# Patient Record
Sex: MALE | Race: WHITE | NOT HISPANIC OR LATINO | Employment: FULL TIME | URBAN - METROPOLITAN AREA
[De-identification: names, ages, dates, MRNs, and addresses within clinical notes are randomized per-mention and may not be internally consistent; named-entity substitution may affect disease eponyms.]

---

## 2018-01-30 ENCOUNTER — OFFICE VISIT (OUTPATIENT)
Dept: FAMILY MEDICINE CLINIC | Facility: CLINIC | Age: 33
End: 2018-01-30

## 2018-01-30 VITALS
RESPIRATION RATE: 16 BRPM | OXYGEN SATURATION: 99 % | WEIGHT: 185 LBS | SYSTOLIC BLOOD PRESSURE: 104 MMHG | DIASTOLIC BLOOD PRESSURE: 72 MMHG | HEART RATE: 84 BPM | HEIGHT: 71 IN | BODY MASS INDEX: 25.9 KG/M2 | TEMPERATURE: 97.9 F

## 2018-01-30 DIAGNOSIS — K21.9 GASTROESOPHAGEAL REFLUX DISEASE, ESOPHAGITIS PRESENCE NOT SPECIFIED: ICD-10-CM

## 2018-01-30 DIAGNOSIS — Z13.220 ENCOUNTER FOR LIPID SCREENING FOR CARDIOVASCULAR DISEASE: ICD-10-CM

## 2018-01-30 DIAGNOSIS — F51.02 ADJUSTMENT INSOMNIA: Primary | ICD-10-CM

## 2018-01-30 DIAGNOSIS — Z13.6 ENCOUNTER FOR LIPID SCREENING FOR CARDIOVASCULAR DISEASE: ICD-10-CM

## 2018-01-30 DIAGNOSIS — Z30.09 VASECTOMY EVALUATION: ICD-10-CM

## 2018-01-30 LAB
ALBUMIN SERPL-MCNC: 4.5 G/DL (ref 3.2–4.8)
ALBUMIN/GLOB SERPL: 1.9 G/DL (ref 1.5–2.5)
ALP SERPL-CCNC: 70 U/L (ref 25–100)
ALT SERPL-CCNC: 26 U/L (ref 7–40)
AST SERPL-CCNC: 14 U/L (ref 0–33)
BASOPHILS # BLD AUTO: 0.02 10*3/MM3 (ref 0–0.2)
BASOPHILS NFR BLD AUTO: 0.4 % (ref 0–1)
BILIRUB SERPL-MCNC: 0.4 MG/DL (ref 0.3–1.2)
BUN SERPL-MCNC: 17 MG/DL (ref 9–23)
BUN/CREAT SERPL: 18.9 (ref 7–25)
CALCIUM SERPL-MCNC: 9.1 MG/DL (ref 8.7–10.4)
CHLORIDE SERPL-SCNC: 107 MMOL/L (ref 99–109)
CHOLEST SERPL-MCNC: 179 MG/DL (ref 0–200)
CHOLEST/HDLC SERPL: 3.58 {RATIO}
CO2 SERPL-SCNC: 27 MMOL/L (ref 20–31)
CREAT SERPL-MCNC: 0.9 MG/DL (ref 0.6–1.3)
EOSINOPHIL # BLD AUTO: 0.05 10*3/MM3 (ref 0–0.3)
EOSINOPHIL NFR BLD AUTO: 0.9 % (ref 0–3)
ERYTHROCYTE [DISTWIDTH] IN BLOOD BY AUTOMATED COUNT: 13.6 % (ref 11.3–14.5)
GFR SERPLBLD CREATININE-BSD FMLA CKD-EPI: 119 ML/MIN/1.73
GFR SERPLBLD CREATININE-BSD FMLA CKD-EPI: 98 ML/MIN/1.73
GLOBULIN SER CALC-MCNC: 2.4 GM/DL
GLUCOSE SERPL-MCNC: 63 MG/DL (ref 70–100)
HCT VFR BLD AUTO: 46.2 % (ref 38.9–50.9)
HDLC SERPL-MCNC: 50 MG/DL (ref 40–60)
HGB BLD-MCNC: 14.6 G/DL (ref 13.1–17.5)
IMM GRANULOCYTES # BLD: 0.01 10*3/MM3 (ref 0–0.03)
IMM GRANULOCYTES NFR BLD: 0.2 % (ref 0–0.6)
LDLC SERPL CALC-MCNC: 112 MG/DL (ref 0–100)
LYMPHOCYTES # BLD AUTO: 2.07 10*3/MM3 (ref 0.6–4.8)
LYMPHOCYTES NFR BLD AUTO: 38.4 % (ref 24–44)
MCH RBC QN AUTO: 28.6 PG (ref 27–31)
MCHC RBC AUTO-ENTMCNC: 31.6 G/DL (ref 32–36)
MCV RBC AUTO: 90.6 FL (ref 80–99)
MONOCYTES # BLD AUTO: 0.62 10*3/MM3 (ref 0–1)
MONOCYTES NFR BLD AUTO: 11.5 % (ref 0–12)
NEUTROPHILS # BLD AUTO: 2.62 10*3/MM3 (ref 1.5–8.3)
NEUTROPHILS NFR BLD AUTO: 48.6 % (ref 41–71)
PLATELET # BLD AUTO: 230 10*3/MM3 (ref 150–450)
POTASSIUM SERPL-SCNC: 4.6 MMOL/L (ref 3.5–5.5)
PROT SERPL-MCNC: 6.9 G/DL (ref 5.7–8.2)
RBC # BLD AUTO: 5.1 10*6/MM3 (ref 4.2–5.76)
SODIUM SERPL-SCNC: 139 MMOL/L (ref 132–146)
TRIGL SERPL-MCNC: 86 MG/DL (ref 0–150)
VLDLC SERPL CALC-MCNC: 17.2 MG/DL
WBC # BLD AUTO: 5.39 10*3/MM3 (ref 3.5–10.8)

## 2018-01-30 PROCEDURE — 99203 OFFICE O/P NEW LOW 30 MIN: CPT | Performed by: FAMILY MEDICINE

## 2018-01-30 RX ORDER — ZOLPIDEM TARTRATE 5 MG/1
5 TABLET ORAL NIGHTLY PRN
Qty: 30 TABLET | Refills: 1 | Status: SHIPPED | OUTPATIENT
Start: 2018-01-30 | End: 2020-08-04

## 2018-01-30 NOTE — PROGRESS NOTES
Assessment/Plan     Problem List Items Addressed This Visit     None      Visit Diagnoses     Adjustment insomnia    -  Primary    Relevant Medications    zolpidem (AMBIEN) 5 MG tablet    Other Relevant Orders    CBC & Differential    Vasectomy evaluation        Relevant Orders    Ambulatory Referral to Urology    Encounter for lipid screening for cardiovascular disease        Relevant Orders    Comprehensive Metabolic Panel    Lipid Panel With / Chol / HDL Ratio    Gastroesophageal reflux disease, esophagitis presence not specified        Relevant Medications    esomeprazole (nexIUM) 20 MG capsule           Follow up: No Follow-up on file.     DISCUSSION  Insomnia: Rec try Ambien 5 mg one po q hs as needed. Try 1st when someone is there to help with children.     Refer to urology for evaluation of the vasectomy.    Migraine headaches: May be worsened due to fatigue and decreased sleep.  If does not improve with the Ambien, he will let us know.  Excedrin Migraine does help the headaches when he gets those.    Gastroesophageal reflux disease.  Okay to use Nexium as needed.  Currently using about twice a week.  Call if needing more frequently.  Recommend avoid foods that cause reflux.    Check blood work for screening as well.    MEDICATIONS PRESCRIBED  Requested Prescriptions     Signed Prescriptions Disp Refills   • zolpidem (AMBIEN) 5 MG tablet 30 tablet 1     Sig: Take 1 tablet by mouth At Night As Needed for Sleep.            Sherwin dated on 1/30/2018   was reviewed and appropriate.        -------------------------------------------    Subjective     Chief Complaint   Patient presents with   • Establish Care   • sleep issues     having trouble falling and staying asleep   • Sterilization     get info on this    • Heartburn       HPI      Needs check up  Wants to maintain health      Interested in vasectomy  3 children ( 2 (five yo) and one 3 yo) ( one of the 6 yo lives with him)  Work for [employer]  Works  "nights      Sleep issues  Issues falling asleep and then staying asleep  3 children, work  Mind races at night  Gets anxious when not able to sleep  Home at 2-3 am. Bed at 4 to 4:30 am - up at 10 to 10:30am. In bed 6 hrs but takes awhile to get to sleep.   No meds tried.   Worked nights 15 yrs  Worse x 6 months  On the 2/2 youngest at . Oldest to go to school.       Headaches as a child, Better as he got older  Now + headaches again q 2 weeks  + migraine  Worse again now  X 1 yr, worse now  If wakes up early, + headache.   Excedrin migraine helps      Heartburn  Takes nexium OTC   As needed and works most of the time helps  Takes 2 times per week.   Certain food make it worse  Not able to drink OJ  Worse with acidic foods    Past Medical History,Medications, Allergies, and social history was reviewed.    Review of Systems   Constitutional: Negative.    Respiratory: Negative.    Cardiovascular: Negative.    Gastrointestinal: Negative.    Musculoskeletal: Negative.    Neurological: Positive for headaches.   Psychiatric/Behavioral: Negative.        Objective     Vitals:    01/30/18 1407   BP: 104/72   Pulse: 84   Resp: 16   Temp: 97.9 °F (36.6 °C)   TempSrc: Temporal Artery    SpO2: 99%   Weight: 83.9 kg (185 lb)   Height: 180.3 cm (71\")        Physical Exam   Constitutional: He is oriented to person, place, and time. Vital signs are normal. He appears well-developed and well-nourished.   HENT:   Head: Normocephalic and atraumatic.   Right Ear: Hearing, tympanic membrane, external ear and ear canal normal.   Left Ear: Hearing, tympanic membrane, external ear and ear canal normal.   Nose: Nose normal.   Mouth/Throat: Oropharynx is clear and moist.   Eyes: Conjunctivae, EOM and lids are normal. Pupils are equal, round, and reactive to light.   Neck: Normal range of motion. Neck supple. No thyromegaly present.   Cardiovascular: Normal rate, regular rhythm and normal heart sounds.  Exam reveals no friction rub.  "   No murmur heard.  Pulmonary/Chest: Effort normal and breath sounds normal. No respiratory distress. He has no wheezes. He has no rales.   Abdominal: Soft. Normal appearance and bowel sounds are normal. He exhibits no distension and no mass. There is no tenderness. There is no rebound and no guarding.   Musculoskeletal: He exhibits no edema.   Neurological: He is alert and oriented to person, place, and time. He has normal strength.   Skin: Skin is warm and dry.   Psychiatric: He has a normal mood and affect. His speech is normal and behavior is normal. Cognition and memory are normal.   Nursing note and vitals reviewed.              Laci Mendoza MD

## 2019-01-02 ENCOUNTER — OFFICE VISIT (OUTPATIENT)
Dept: FAMILY MEDICINE CLINIC | Facility: CLINIC | Age: 34
End: 2019-01-02

## 2019-01-02 VITALS
WEIGHT: 187 LBS | TEMPERATURE: 97.8 F | DIASTOLIC BLOOD PRESSURE: 86 MMHG | RESPIRATION RATE: 16 BRPM | HEART RATE: 84 BPM | BODY MASS INDEX: 26.18 KG/M2 | HEIGHT: 71 IN | SYSTOLIC BLOOD PRESSURE: 128 MMHG

## 2019-01-02 DIAGNOSIS — R45.4 IRRITABILITY AND ANGER: Primary | ICD-10-CM

## 2019-01-02 DIAGNOSIS — F33.1 MODERATE EPISODE OF RECURRENT MAJOR DEPRESSIVE DISORDER (HCC): ICD-10-CM

## 2019-01-02 PROCEDURE — 99214 OFFICE O/P EST MOD 30 MIN: CPT | Performed by: FAMILY MEDICINE

## 2019-01-02 RX ORDER — FLUOXETINE HYDROCHLORIDE 20 MG/1
20 CAPSULE ORAL DAILY
Qty: 30 CAPSULE | Refills: 2 | Status: SHIPPED | OUTPATIENT
Start: 2019-01-02 | End: 2019-01-30 | Stop reason: SDUPTHER

## 2019-01-02 RX ORDER — ARIPIPRAZOLE 5 MG/1
5 TABLET ORAL DAILY
Qty: 30 TABLET | Refills: 2 | Status: SHIPPED | OUTPATIENT
Start: 2019-01-02 | End: 2019-01-30 | Stop reason: SDUPTHER

## 2019-01-02 NOTE — PATIENT INSTRUCTIONS
Go to the nearest ER or return to clinic if symptoms worsen, fever/chill develop      Tips for Managing Your Anger  HOW CAN ANGER AFFECT MY LIFE?  Everyone feels angry from time to time. It is okay and normal to feel angry. However, the way that you behave or react to anger can make it a problem. If you react too strongly to anger or you cannot control your anger, that can cause relationships problems at home and work.  Anger can also affect your health. Uncontrolled anger increases your risk of heart disease. When you are angry, your heart rate and blood pressure rise. Levels of certain energy hormones, such as adrenaline, also increase. When this happens, your heart has to work harder. In extreme cases, anger can cause the blood vessels to become narrow. This reduces the supply of blood and oxygen to the heart, and that can trigger chest pain (angina).  Anger can also trigger stress-related problems, such as:  · Headaches.  · Poor digestion.  · Trouble sleeping (insomnia).    WHAT ACTIONS CAN I TAKE TO HELP MANAGE MY ANGER?  You can take actions to help you manage your anger. For example:  · Express your anger. When you express your anger in a healthy way, it is a form of communication. The following strategies can help you to express your anger in a healthy way and when you are ready to do so:  ? Step away. When you are feeling reactive, it may take at least 20 minutes for your body to return to its normal blood pressure and heart rate. To help your body do this, take a walk, listen to music, stretch, take deep breaths, and avoid the situation or person who is making you angry. Try to only discuss your anger when you feel calm again.  ? Try to consider how others feel before you react. Avoid swearing, sighing, raising your voice, or blaming.  ? Choose a good time to work through problems. You may be more likely to lose your temper at the end of the day when you are tired.  ? Keep an anger journal. Writing down  the situations that make you angry can help you figure out what triggers your anger and why.  · Consider changing your perception. Is there another way you can view the situation that will leave you with a different emotion? Sometimes, changing the way you think about a situation can make it seem less infuriating. Here are some ways to do that:  ? Remind yourself that everyone is not out to get you.  ? Remind yourself that a disappointing result is not the end of the world.  ? Take steps to solve or prevent the situation that upsets you.  ? Find the humor in an aggravating situation.  ? Deal with the physical effects by taking deep breaths, exercising, or taking a walk.  ? Slowly repeat the word “relax” or another calming phrase.  ? Picture a relaxing image in your mind. Close your eyes and use that image to help you calm yourself.    WHEN SHOULD I SEEK ADDITIONAL HELP?  Anger becomes a problem if it occurs frequently and lasts for long periods of time. You may also need help managing your anger if:  · You use physical force or aggression when you are angry and others feel threatened and fearful.  · You feel that your anger is out of control.  · Anger is interfering with your job.  · Anger is causing problems with your health.  · Anger is causing problems with your relationships.  · Anger is affecting your ability to tolerate normal daily situations, such as sitting in a traffic jam or waiting in line.  · You treat others disrespectfully.  · You do not trust people around you.    It may help to ask someone you trust whether he or she thinks you show any of these signs. Sometimes, it can be hard to recognize the problem yourself.  WHERE CAN I GET SUPPORT?  A psychologist or another licensed mental health professional can help you learn how to manage your anger. Ask your health care provider for a referral, or look online to find a psychologist who specializes in anger management. You can search the websites of many  mental health organizations to find a mental health care provider. Local Domestic Abuse Projects are also available for help.  Your local hospital or behavioral counselors in your area may also offer anger management programs or support groups that can help.  WHERE CAN I FIND MORE INFORMATION?  · The U.S. Centers for Disease Control and Prevention: www.cdc.gov/bam/life/getting-along3.html  · American Psychological Association: www.apa.org/topics/anger/  · The Substance Abuse and Mental Health Services Administration: http://www.samhsa.gov/samhsaNewsLetter/Volume_22_Number_3/working_with_anger/  · National Resource Center on Domestic Violence: http://www.nrcdv.org/dvam/    This information is not intended to replace advice given to you by your health care provider. Make sure you discuss any questions you have with your health care provider.  Document Released: 10/14/2008 Document Revised: 08/20/2017 Document Reviewed: 10/21/2016  Elsevier Interactive Patient Education © 2018 Elsevier Inc.
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
## 2019-01-30 ENCOUNTER — OFFICE VISIT (OUTPATIENT)
Dept: FAMILY MEDICINE CLINIC | Facility: CLINIC | Age: 34
End: 2019-01-30

## 2019-01-30 VITALS
RESPIRATION RATE: 16 BRPM | BODY MASS INDEX: 27.02 KG/M2 | HEART RATE: 78 BPM | DIASTOLIC BLOOD PRESSURE: 72 MMHG | WEIGHT: 193 LBS | HEIGHT: 71 IN | TEMPERATURE: 98 F | SYSTOLIC BLOOD PRESSURE: 124 MMHG

## 2019-01-30 DIAGNOSIS — F33.1 MODERATE EPISODE OF RECURRENT MAJOR DEPRESSIVE DISORDER (HCC): ICD-10-CM

## 2019-01-30 DIAGNOSIS — R45.4 IRRITABILITY AND ANGER: ICD-10-CM

## 2019-01-30 PROCEDURE — 99213 OFFICE O/P EST LOW 20 MIN: CPT | Performed by: FAMILY MEDICINE

## 2019-01-30 RX ORDER — FLUOXETINE HYDROCHLORIDE 20 MG/1
20 CAPSULE ORAL DAILY
Qty: 30 CAPSULE | Refills: 2 | Status: SHIPPED | OUTPATIENT
Start: 2019-01-30 | End: 2020-07-28

## 2019-01-30 RX ORDER — ARIPIPRAZOLE 5 MG/1
5 TABLET ORAL DAILY
Qty: 30 TABLET | Refills: 2 | Status: SHIPPED | OUTPATIENT
Start: 2019-01-30 | End: 2020-07-28

## 2019-01-30 NOTE — PROGRESS NOTES
Subjective   Kashif Garcia is a 33 y.o. male.     History of Present Illness     His mood is better on the prozac and abilify  He has been pleased with results  He is doing counseling that has been court ordered with anger management.  He will then be going to [facility] in the future  Not feeling as down nor depressed  Motivation and get up and go also improved      Review of Systems   Psychiatric/Behavioral: Negative for dysphoric mood. The patient is not nervous/anxious.        Objective   Physical Exam   Constitutional: He appears well-developed and well-nourished. No distress.   Cardiovascular: Normal rate, regular rhythm and normal heart sounds.   Pulmonary/Chest: Effort normal and breath sounds normal.   Psychiatric: He has a normal mood and affect. His behavior is normal. Judgment and thought content normal.   Nursing note and vitals reviewed.      Assessment/Plan   Kashif was seen today for follow-up.    Diagnoses and all orders for this visit:    Irritability and anger  -     FLUoxetine (PROZAC) 20 MG capsule; Take 1 capsule by mouth Daily.  -     ARIPiprazole (ABILIFY) 5 MG tablet; Take 1 tablet by mouth Daily.    Moderate episode of recurrent major depressive disorder (CMS/HCC)  -     FLUoxetine (PROZAC) 20 MG capsule; Take 1 capsule by mouth Daily.  -     ARIPiprazole (ABILIFY) 5 MG tablet; Take 1 tablet by mouth Daily.    mood has been really doing great with prozac and abilify.  He has seen a major improvement in how he has been feeling with the medicine.  He will continue counseling and we will see him back in 5 months

## 2020-02-07 ENCOUNTER — OFFICE VISIT (OUTPATIENT)
Dept: FAMILY MEDICINE CLINIC | Facility: CLINIC | Age: 35
End: 2020-02-07

## 2020-02-07 VITALS
HEART RATE: 78 BPM | BODY MASS INDEX: 28.14 KG/M2 | WEIGHT: 201 LBS | SYSTOLIC BLOOD PRESSURE: 142 MMHG | RESPIRATION RATE: 18 BRPM | TEMPERATURE: 97.7 F | DIASTOLIC BLOOD PRESSURE: 80 MMHG | HEIGHT: 71 IN

## 2020-02-07 DIAGNOSIS — Z72.0 TOBACCO ABUSE: ICD-10-CM

## 2020-02-07 DIAGNOSIS — J40 BRONCHITIS: Primary | ICD-10-CM

## 2020-02-07 PROCEDURE — 99406 BEHAV CHNG SMOKING 3-10 MIN: CPT | Performed by: FAMILY MEDICINE

## 2020-02-07 PROCEDURE — 99214 OFFICE O/P EST MOD 30 MIN: CPT | Performed by: FAMILY MEDICINE

## 2020-02-07 RX ORDER — AZITHROMYCIN 250 MG/1
TABLET, FILM COATED ORAL
Qty: 6 TABLET | Refills: 0 | Status: SHIPPED | OUTPATIENT
Start: 2020-02-07 | End: 2020-07-28

## 2020-02-07 RX ORDER — PREDNISONE 20 MG/1
40 TABLET ORAL DAILY
Qty: 10 TABLET | Refills: 0 | Status: SHIPPED | OUTPATIENT
Start: 2020-02-07 | End: 2020-07-28

## 2020-02-07 RX ORDER — VARENICLINE TARTRATE 1 MG/1
1 TABLET, FILM COATED ORAL 2 TIMES DAILY
Qty: 60 TABLET | Refills: 3 | Status: SHIPPED | OUTPATIENT
Start: 2020-02-07 | End: 2020-07-28

## 2020-02-07 NOTE — PROGRESS NOTES
Subjective   Kashif Garcia is a 34 y.o. male.     History of Present Illness     He has had more congestion the last week or so  Deep chest congestion  Voice is altered as well  Feeling slightly worse  Using OTC medicine without help    He smokes about 1/2-1 PPD and has done so the last 15 years  He is motivated to quit smoking  Failed the patch as it did not help enough  Wants to quit for his 4 kids, recently had new daughter  Wife is quitting as well so great time for him to quit  Asks about chantix      The following portions of the patient's history were reviewed and updated as appropriate: allergies, current medications, past family history, past medical history, past social history, past surgical history and problem list.    Review of Systems   Constitutional: Negative.  Negative for fever.   HENT: Positive for congestion.    Eyes: Negative.    Respiratory: Positive for cough.    Cardiovascular: Negative.    Gastrointestinal: Negative.    Musculoskeletal: Negative.    Skin: Negative.    Neurological: Negative.    Psychiatric/Behavioral: Negative.    All other systems reviewed and are negative.      Objective   Physical Exam   Constitutional: He is oriented to person, place, and time. He appears well-developed and well-nourished. No distress.   HENT:   Head: Normocephalic and atraumatic.   Right Ear: Hearing, tympanic membrane, external ear and ear canal normal.   Left Ear: Hearing, tympanic membrane, external ear and ear canal normal.   Nose: Nose normal.   Mouth/Throat: Uvula is midline, oropharynx is clear and moist and mucous membranes are normal.   Eyes: Conjunctivae and EOM are normal.   Neck: Normal range of motion.   Cardiovascular: Normal rate, regular rhythm and normal heart sounds.   Pulmonary/Chest: Effort normal.   Minor congestion in chest   Lymphadenopathy:     He has no cervical adenopathy.   Neurological: He is alert and oriented to person, place, and time.   Psychiatric: He has a normal  mood and affect. His behavior is normal. Judgment and thought content normal.   Nursing note and vitals reviewed.      Assessment/Plan   Kashif was seen today for nicotine dependence.    Diagnoses and all orders for this visit:    Bronchitis  -     predniSONE (DELTASONE) 20 MG tablet; Take 2 tablets by mouth Daily.  -     azithromycin (ZITHROMAX) 250 MG tablet; Take 2 tablets the first day, then 1 tablet daily for 4 days.    Tobacco abuse  -     varenicline (CHANTIX CONTINUING MONTH HAL) 1 MG tablet; Take 1 tablet by mouth 2 (Two) Times a Day.  -     varenicline (CHANTIX STARTING MONTH PAK) 0.5 MG X 11 & 1 MG X 42 tablet; Take 0.5 mg one daily on days 1-2 and 0.5 mg twice daily on days 4-7. Then 1 mg twice daily for a total of 12 weeks.    due to duration and progression of infection will treat with steroids and antibiotics, pt to call back INB    Discussed with pt the health implications of smoking and the benefits of cessation.  Reviewed pros/cons/SE of medications and prescription for chantix sent in.  Pt advised to get rid of ashtrays and lighters to make it hard to resume smoking and encouragement give.  F/u with any issues or complaints.  More than 5 minutes spent discussing this.

## 2020-07-28 ENCOUNTER — TELEMEDICINE (OUTPATIENT)
Dept: FAMILY MEDICINE CLINIC | Facility: CLINIC | Age: 35
End: 2020-07-28

## 2020-07-28 DIAGNOSIS — R52 GENERALIZED BODY ACHES: ICD-10-CM

## 2020-07-28 DIAGNOSIS — J02.9 SORE THROAT: ICD-10-CM

## 2020-07-28 DIAGNOSIS — J06.9 ACUTE URI: Primary | ICD-10-CM

## 2020-07-28 PROCEDURE — 99213 OFFICE O/P EST LOW 20 MIN: CPT | Performed by: PHYSICIAN ASSISTANT

## 2020-07-28 RX ORDER — PREDNISONE 20 MG/1
20 TABLET ORAL DAILY
Qty: 10 TABLET | Refills: 0 | Status: SHIPPED | OUTPATIENT
Start: 2020-07-28 | End: 2020-08-04

## 2020-07-28 RX ORDER — AZITHROMYCIN 250 MG/1
TABLET, FILM COATED ORAL
Qty: 6 TABLET | Refills: 0 | Status: SHIPPED | OUTPATIENT
Start: 2020-07-28 | End: 2020-08-04

## 2020-07-28 NOTE — PROGRESS NOTES
Subjective   Kashif Garcia is a 35 y.o. male.     Video visit  You have chosen to receive care through a telehealth visit.  Do you consent to use a video/audio connection for your medical care today? Yes    History of Present Illness   Pt presents for video visit with CC of sore throat, hoarse voice, HA, body aches (all over)   Symptoms started on Friday. Worsened Monday   Sneezing occasionally. Some drainage.   Not super painful to swallow   No sinus pressure in cheeks or forehead   No ear pain   No cough or SOB   No fever he suspects, thermometer has not been helping   No alteration of taste and smell     No frequent strep infections   Feels like symptoms are getting worse   No change in bowel movements   No urinary symptoms       Pt works at [employer]. No recent travel. Camped at [place] during shut down   Infant in home has been coughing. Wife states she had some body aches and diarrhea a few days ago   Would like to be screened for COVID 19  No known contact with coronavirus     The following portions of the patient's history were reviewed and updated as appropriate: allergies, current medications, past family history, past medical history, past social history, past surgical history and problem list.    Review of Systems   Constitutional: Positive for fatigue. Negative for chills, diaphoresis and fever.   HENT: Positive for congestion, sore throat, trouble swallowing and voice change. Negative for ear discharge, ear pain, hearing loss, nosebleeds, postnasal drip, sinus pressure and sneezing.    Eyes: Negative.    Respiratory: Negative.  Negative for cough, chest tightness, shortness of breath and wheezing.    Cardiovascular: Negative.  Negative for chest pain, palpitations and leg swelling.   Gastrointestinal: Negative for abdominal distention, abdominal pain, blood in stool, constipation, diarrhea, nausea and vomiting.   Genitourinary: Negative.  Negative for difficulty urinating, dysuria, flank pain,  frequency, hematuria and urgency.   Musculoskeletal: Positive for myalgias. Negative for arthralgias, back pain, gait problem, joint swelling, neck pain and neck stiffness.   Skin: Negative.  Negative for color change, pallor, rash and wound.   Neurological: Positive for headaches. Negative for dizziness, syncope, weakness, light-headedness and numbness.       Objective    Physical Exam   Constitutional: He is oriented to person, place, and time. He appears well-developed and well-nourished. No distress.   HENT:   Hoarse voice    Pulmonary/Chest: Effort normal.   Neurological: He is alert and oriented to person, place, and time.   Psychiatric: He has a normal mood and affect. His behavior is normal. Judgment and thought content normal.   unable to obtain additional physical exam due to video visit     Assessment/Plan   Kashif was seen today for sore throat.    Diagnoses and all orders for this visit:    Acute URI  -     azithromycin (Zithromax Z-Johan) 250 MG tablet; Take 2 tablets the first day, then 1 tablet daily for 4 days.  -     predniSONE (DELTASONE) 20 MG tablet; Take 1 tablet by mouth Daily.    Sore throat  -     COVID-19, AEL, NP Swab in Transport Media,Saline, or ESwab 36-72 HR TAT - Swab, Nasopharynx    Generalized body aches  -     COVID-19, AEL, NP Swab in Transport Media,Saline, or ESwab 36-72 HR TAT - Swab, Nasopharynx      Begin treatment as outlined in plan   Due to current symptoms, will have patient screened for COVID 19 as well  Self quarantine directions provided   Follow up at  if new or worsening symptoms develop  Will remain off work pending COVID testing     This visit has been scheduled as a video visit to comply with patient safety concerns in accordance with CDC recommendations. Total time of discussion was 15 minutes.

## 2020-08-04 ENCOUNTER — OFFICE VISIT (OUTPATIENT)
Dept: FAMILY MEDICINE CLINIC | Facility: CLINIC | Age: 35
End: 2020-08-04

## 2020-08-04 VITALS
HEART RATE: 78 BPM | HEIGHT: 71 IN | WEIGHT: 223 LBS | BODY MASS INDEX: 31.22 KG/M2 | RESPIRATION RATE: 18 BRPM | TEMPERATURE: 97.6 F | SYSTOLIC BLOOD PRESSURE: 124 MMHG | DIASTOLIC BLOOD PRESSURE: 84 MMHG

## 2020-08-04 DIAGNOSIS — G89.29 CHRONIC INTRACTABLE HEADACHE, UNSPECIFIED HEADACHE TYPE: ICD-10-CM

## 2020-08-04 DIAGNOSIS — J01.10 ACUTE NON-RECURRENT FRONTAL SINUSITIS: Primary | ICD-10-CM

## 2020-08-04 DIAGNOSIS — R51.9 CHRONIC INTRACTABLE HEADACHE, UNSPECIFIED HEADACHE TYPE: ICD-10-CM

## 2020-08-04 PROCEDURE — 99213 OFFICE O/P EST LOW 20 MIN: CPT | Performed by: FAMILY MEDICINE

## 2020-08-04 RX ORDER — ARIPIPRAZOLE 5 MG/1
5 TABLET ORAL
COMMUNITY
Start: 2019-01-30 | End: 2020-10-02

## 2020-08-04 RX ORDER — AMOXICILLIN AND CLAVULANATE POTASSIUM 875; 125 MG/1; MG/1
1 TABLET, FILM COATED ORAL 2 TIMES DAILY
Qty: 14 TABLET | Refills: 0 | Status: SHIPPED | OUTPATIENT
Start: 2020-08-04 | End: 2020-08-10

## 2020-08-04 RX ORDER — ACETAMINOPHEN AND CODEINE PHOSPHATE 300; 30 MG/1; MG/1
1 TABLET ORAL EVERY 4 HOURS PRN
Qty: 10 TABLET | Refills: 0 | Status: SHIPPED | OUTPATIENT
Start: 2020-08-04 | End: 2020-10-23 | Stop reason: SDUPTHER

## 2020-08-04 RX ORDER — FLUOXETINE HYDROCHLORIDE 20 MG/1
20 CAPSULE ORAL
COMMUNITY
Start: 2019-01-30 | End: 2020-10-02

## 2020-08-04 NOTE — PROGRESS NOTES
Subjective   Kashif Garcia is a 35 y.o. male.     History of Present Illness     He was tested for COVID on 7/29, this was negative    He had lost his voice  Had ST and fever  Had some body aches and some mild HA    On 7/28 he was placed on azithromycin and prednisone    No fever in the last 6 days  Today he has some mild achiness and some chest congestion but this is improved  Still mild HA and body aches    No one else in the house is ill, 4 kids and fiance lives with him    Review of Systems   Constitutional: Negative for fever.   HENT: Positive for voice change.    Musculoskeletal: Positive for myalgias.       Objective   Physical Exam   Constitutional: He appears well-developed and well-nourished. No distress.   HENT:   Head: Normocephalic and atraumatic.   Neck: No thyromegaly present.   Cardiovascular: Normal rate, regular rhythm and normal heart sounds.   Pulmonary/Chest: Effort normal and breath sounds normal.   Lymphadenopathy:     He has no cervical adenopathy.   Psychiatric: He has a normal mood and affect. His behavior is normal. Judgment and thought content normal.   Nursing note and vitals reviewed.      Assessment/Plan   Kashif was seen today for generalized body aches and uri.    Diagnoses and all orders for this visit:    Acute non-recurrent frontal sinusitis  -     amoxicillin-clavulanate (Augmentin) 875-125 MG per tablet; Take 1 tablet by mouth 2 (Two) Times a Day.    Chronic intractable headache, unspecified headache type  -     acetaminophen-codeine (TYLENOL #3) 300-30 MG per tablet; Take 1 tablet by mouth Every 4 (Four) Hours As Needed for Moderate Pain .    he is seeing improvement, will keep him off work this week  Plan to RTW on Monday 8/10/20  Will try augmenitn BID and tylenol #3,  Fluids, rest, and more time

## 2020-08-10 ENCOUNTER — OFFICE VISIT (OUTPATIENT)
Dept: FAMILY MEDICINE CLINIC | Facility: CLINIC | Age: 35
End: 2020-08-10

## 2020-08-10 VITALS
BODY MASS INDEX: 31.22 KG/M2 | TEMPERATURE: 98 F | SYSTOLIC BLOOD PRESSURE: 141 MMHG | HEART RATE: 88 BPM | HEIGHT: 71 IN | RESPIRATION RATE: 18 BRPM | WEIGHT: 223 LBS | DIASTOLIC BLOOD PRESSURE: 99 MMHG

## 2020-08-10 DIAGNOSIS — J01.10 ACUTE NON-RECURRENT FRONTAL SINUSITIS: Primary | ICD-10-CM

## 2020-08-10 PROCEDURE — 99213 OFFICE O/P EST LOW 20 MIN: CPT | Performed by: FAMILY MEDICINE

## 2020-08-10 NOTE — PROGRESS NOTES
Subjective   Kashif Garcia is a 35 y.o. male.     History of Present Illness     He is feeling bck to normal  No complaints and he wants to go back to work  Works at [employer]        Review of Systems   Constitutional: Negative.    HENT: Negative.    Psychiatric/Behavioral: Negative.        Objective   Physical Exam   Constitutional: He appears well-developed and well-nourished. No distress.   Cardiovascular: Normal rate, regular rhythm and normal heart sounds.   Pulmonary/Chest: Effort normal and breath sounds normal.   Psychiatric: He has a normal mood and affect. His behavior is normal.   Nursing note and vitals reviewed.      Assessment/Plan   Kashif was seen today for return to work.    Diagnoses and all orders for this visit:    Acute non-recurrent frontal sinusitis      Ok to RWT without restrictions, he needed note for work

## 2020-10-02 ENCOUNTER — TELEMEDICINE (OUTPATIENT)
Dept: FAMILY MEDICINE CLINIC | Facility: CLINIC | Age: 35
End: 2020-10-02

## 2020-10-02 DIAGNOSIS — G44.83 PRIMARY COUGH HEADACHE: ICD-10-CM

## 2020-10-02 DIAGNOSIS — J02.9 SORE THROAT: ICD-10-CM

## 2020-10-02 DIAGNOSIS — J06.9 VIRAL URI WITH COUGH: Primary | ICD-10-CM

## 2020-10-02 PROCEDURE — 99213 OFFICE O/P EST LOW 20 MIN: CPT | Performed by: NURSE PRACTITIONER

## 2020-10-02 RX ORDER — ALBUTEROL SULFATE 90 UG/1
2 AEROSOL, METERED RESPIRATORY (INHALATION)
COMMUNITY
Start: 2020-07-29

## 2020-10-02 NOTE — PROGRESS NOTES
You have chosen to receive care through a telehealth visit.  Do you consent to use a video/audio connection for your medical care today? Yes  Covid negative, flu test, [facility]   ST, cough and change in voice, runny nose, diarrhea, HA, Tylenol 3 due to Migraine yesterday, body ache, tightness in chest but no wheezing or difficulty dry cough non productive Sept 29 woke up after having to wear a fitted mask  He feels poorly, he has 4 children at home and is concerned about getting them sick. No one else is ill.    ROS: as above    PE: video visit    DX: URI with cough, ST, headache    Plan: Pt has an Albuterol inhaler at home which he has not started using yet, encouraged to get this out and use it as directed.  Will send in Breo inhaler to use one inhalation per day and rinse  Will prescribe some cough medication to help with cough so he can sleep. If he remains symptomatic he cannot return to work on Monday. He can forward FMLA forms to be completed. F/U Friday if still off work to reassess. Pt agrees

## 2020-10-09 ENCOUNTER — TELEMEDICINE (OUTPATIENT)
Dept: FAMILY MEDICINE CLINIC | Facility: CLINIC | Age: 35
End: 2020-10-09

## 2020-10-09 DIAGNOSIS — J06.9 URI WITH COUGH AND CONGESTION: Primary | ICD-10-CM

## 2020-10-09 DIAGNOSIS — R06.2 WHEEZING: ICD-10-CM

## 2020-10-09 PROCEDURE — 99213 OFFICE O/P EST LOW 20 MIN: CPT | Performed by: NURSE PRACTITIONER

## 2020-10-09 RX ORDER — AZITHROMYCIN 250 MG/1
TABLET, FILM COATED ORAL
Qty: 6 TABLET | Refills: 0 | Status: SHIPPED | OUTPATIENT
Start: 2020-10-09 | End: 2020-10-16

## 2020-10-09 RX ORDER — PREDNISONE 20 MG/1
TABLET ORAL
Qty: 10 TABLET | Refills: 0 | Status: SHIPPED | OUTPATIENT
Start: 2020-10-09 | End: 2020-10-16

## 2020-10-09 NOTE — PROGRESS NOTES
You have chosen to receive care through a telehealth visit.  Do you consent to use a video/audio connection for your medical care today? Yes    HPI:  Cough and congestion, deep cough and nasal congestion  Inhaler is helping and cough med is helping   Feeling tightness in chest, Breo helps   Still off work, no fever or chills, cannot return until symptoms have resolved. Has FMLA forms that he needs to have completed.  Daughter is sick with symptoms now too.    ROS: as above    PE: video visit  Deep dry, hacky cough. No respiratory distress. Pink skin color. Alert and oriented.    DX: URI with cough, wheezing, congestion    Plan: will send in Zpak and steroids, continue Breo and Alburerol inhalers, can use OTC medication for congestion.  Will complete FMLA forms when available from employer. If cough persists pt will need to have CXR. Pt agrees. Off work until 10/16 .

## 2020-10-16 ENCOUNTER — TELEMEDICINE (OUTPATIENT)
Dept: FAMILY MEDICINE CLINIC | Facility: CLINIC | Age: 35
End: 2020-10-16

## 2020-10-16 ENCOUNTER — HOSPITAL ENCOUNTER (OUTPATIENT)
Dept: GENERAL RADIOLOGY | Facility: HOSPITAL | Age: 35
Discharge: HOME | End: 2020-10-16
Admitting: NURSE PRACTITIONER

## 2020-10-16 ENCOUNTER — TELEPHONE (OUTPATIENT)
Dept: FAMILY MEDICINE CLINIC | Facility: CLINIC | Age: 35
End: 2020-10-16

## 2020-10-16 DIAGNOSIS — G44.52 NEW DAILY PERSISTENT HEADACHE: ICD-10-CM

## 2020-10-16 DIAGNOSIS — R05.3 PERSISTENT COUGH: Primary | ICD-10-CM

## 2020-10-16 PROCEDURE — 99213 OFFICE O/P EST LOW 20 MIN: CPT | Performed by: NURSE PRACTITIONER

## 2020-10-16 PROCEDURE — 71046 X-RAY EXAM CHEST 2 VIEWS: CPT

## 2020-10-16 RX ORDER — BROMPHENIRAMINE MALEATE, PSEUDOEPHEDRINE HYDROCHLORIDE, AND DEXTROMETHORPHAN HYDROBROMIDE 2; 30; 10 MG/5ML; MG/5ML; MG/5ML
5 SYRUP ORAL 4 TIMES DAILY PRN
Qty: 180 ML | Refills: 0 | Status: SHIPPED | OUTPATIENT
Start: 2020-10-16 | End: 2020-10-23 | Stop reason: SDUPTHER

## 2020-10-16 NOTE — PROGRESS NOTES
Subjective   Kashif Garcia is a 35 y.o. male.     You have chosen to receive care through a telehealth visit.  Do you consent to use a video/audio connection for your medical care today? Yes    History of Present Illness   Cough and PND, HA constant Excedrin Migraine   Vomiting twice last night  Dry cough and   Taking OTC allergy medication  The following portions of the patient's history were reviewed and updated as appropriate: allergies, current medications, past family history, past medical history, past social history, past surgical history and problem list.    Review of Systems   Constitutional: Positive for activity change and fatigue. Negative for appetite change, chills, fever, unexpected weight gain and unexpected weight loss.   HENT: Positive for congestion, postnasal drip and sinus pressure. Negative for rhinorrhea, sore throat, swollen glands and trouble swallowing.    Eyes: Negative.  Negative for blurred vision and visual disturbance.   Respiratory: Positive for cough. Negative for chest tightness, shortness of breath and wheezing.    Cardiovascular: Negative.  Negative for chest pain and palpitations.   Gastrointestinal: Positive for nausea and vomiting (due to PND). Negative for abdominal distention.   Endocrine: Negative.    Genitourinary: Negative.    Musculoskeletal: Positive for neck pain and neck stiffness. Negative for myalgias.   Allergic/Immunologic: Negative.  Negative for environmental allergies.   Neurological: Positive for headache (chronic). Negative for dizziness and light-headedness.   Hematological: Negative.  Does not bruise/bleed easily.   Psychiatric/Behavioral: Positive for sleep disturbance (due to cough).       Objective   Physical Exam  Neck:      Musculoskeletal: Neck supple.   Pulmonary:      Effort: No respiratory distress.      Comments: Deep dry frequent cough    Neurological:      Mental Status: He is alert and oriented to person, place, and time.   Psychiatric:          Mood and Affect: Mood normal.         Behavior: Behavior normal.         Thought Content: Thought content normal.         Judgment: Judgment normal.       Video visit:     Assessment/Plan   Diagnoses and all orders for this visit:    1. Persistent cough (Primary)  -     brompheniramine-pseudoephedrine-DM 30-2-10 MG/5ML syrup; Take 5 mL by mouth 4 (Four) Times a Day As Needed for Congestion or Cough.  Dispense: 180 mL; Refill: 0  -     XR Chest PA & Lateral  -     COVID-19,[facility] ROUTINE, NP/OP SWAB IN TRANSPORT MEDIA OR ESWAB 72 HR TAT - Swab, Anterior nasal  -     CBC & Differential  -     Basic metabolic panel      Will send pt for Covid test, will send in some bromfed   Will notify pt of results

## 2020-10-16 NOTE — TELEPHONE ENCOUNTER
Will see what the COVID test and CXR show, then will go from there, if gets worse go to ER for evaluation. ajc

## 2020-10-16 NOTE — TELEPHONE ENCOUNTER
PATIENT STATES HE WAS SENT FOR A CHEST X RAY AND LAB WORK. PATIENT STATES THE  IS NOT IN ON FRIDAYS. PATIENT WOULD LIKE TO KNOW WHAT HE SHOULD DO ABOUT GETTING THE LABS DONE.    CALL BACK [phone]

## 2020-10-17 LAB — SARS-COV-2 RNA RESP QL NAA+PROBE: NOT DETECTED

## 2020-10-23 ENCOUNTER — TELEMEDICINE (OUTPATIENT)
Dept: FAMILY MEDICINE CLINIC | Facility: CLINIC | Age: 35
End: 2020-10-23

## 2020-10-23 DIAGNOSIS — R05.3 PERSISTENT COUGH: ICD-10-CM

## 2020-10-23 DIAGNOSIS — R51.9 CHRONIC INTRACTABLE HEADACHE, UNSPECIFIED HEADACHE TYPE: ICD-10-CM

## 2020-10-23 DIAGNOSIS — G89.29 CHRONIC INTRACTABLE HEADACHE, UNSPECIFIED HEADACHE TYPE: ICD-10-CM

## 2020-10-23 PROCEDURE — 99213 OFFICE O/P EST LOW 20 MIN: CPT | Performed by: NURSE PRACTITIONER

## 2020-10-23 RX ORDER — ACETAMINOPHEN AND CODEINE PHOSPHATE 300; 30 MG/1; MG/1
1 TABLET ORAL EVERY 4 HOURS PRN
Qty: 10 TABLET | Refills: 0 | Status: SHIPPED | OUTPATIENT
Start: 2020-10-23 | End: 2020-10-23 | Stop reason: SDUPTHER

## 2020-10-23 RX ORDER — ACETAMINOPHEN AND CODEINE PHOSPHATE 300; 30 MG/1; MG/1
1 TABLET ORAL EVERY 4 HOURS PRN
Qty: 10 TABLET | Refills: 0 | Status: SHIPPED | OUTPATIENT
Start: 2020-10-23

## 2020-10-23 RX ORDER — BROMPHENIRAMINE MALEATE, PSEUDOEPHEDRINE HYDROCHLORIDE, AND DEXTROMETHORPHAN HYDROBROMIDE 2; 30; 10 MG/5ML; MG/5ML; MG/5ML
5 SYRUP ORAL 4 TIMES DAILY PRN
Qty: 180 ML | Refills: 1 | Status: SHIPPED | OUTPATIENT
Start: 2020-10-23 | End: 2020-11-04

## 2020-10-23 NOTE — PROGRESS NOTES
You have chosen to receive care through a telehealth visit.  Do you consent to use a video/audio connection for your medical care today? Yes    F/U cough  Continues to cough constantly with dry, non productive cough, has some congestion in nasal passage, taking Bromfed which has helped his congestion. Taking Breo 100 which he can tell helps some but still coughing. He does not know what to tell his employer. His wife is sick with symptoms now.  No fever or chills, no wheezing or difficulty breathing but he does have SOA and DICKSON especially if he goes up and down steps. And when he exerts himself he coughs even more. He has not tried to wear a mask yet. Still having HA, very bad yesterday. Would like a refill on Tylenol #3.  He has not been going out anywhere since he and his wife have 4 children. He needs to go back to work.   Recent COVID test last Friday was negative, previous Covid test at [facility] rapid test was negative. His CXR was negative.    ROS: as above    PE: dry hacky cough frequently through out the visit, he cannot carry on a conversation without coughing.     DX: persistent cough X 3 weeks    Plan: will refer pt to Pulmonary specialist.  Will change inhaler from Breo to Dulera as we have some samples. Continue Bromfed (refill sent to pharmacy). Stay hydrated and keep humidifier in house.   Once seen by Pulmonary hopefully soon after that he will be ready to return to work.  Refill Tylenol #3. [facility]

## 2020-10-24 DIAGNOSIS — J06.9 VIRAL URI WITH COUGH: ICD-10-CM

## 2020-11-02 ENCOUNTER — LAB (OUTPATIENT)
Dept: PULMONOLOGY | Facility: CLINIC | Age: 35
End: 2020-11-02

## 2020-11-02 DIAGNOSIS — Z01.812 BLOOD TESTS PRIOR TO TREATMENT OR PROCEDURE: Primary | ICD-10-CM

## 2020-11-02 PROCEDURE — 99000 SPECIMEN HANDLING OFFICE-LAB: CPT | Performed by: INTERNAL MEDICINE

## 2020-11-02 PROCEDURE — U0004 COV-19 TEST NON-CDC HGH THRU: HCPCS | Performed by: INTERNAL MEDICINE

## 2020-11-03 LAB — SARS-COV-2 RNA RESP QL NAA+PROBE: NOT DETECTED

## 2020-11-04 ENCOUNTER — OFFICE VISIT (OUTPATIENT)
Dept: PULMONOLOGY | Facility: CLINIC | Age: 35
End: 2020-11-04

## 2020-11-04 VITALS
BODY MASS INDEX: 30.8 KG/M2 | DIASTOLIC BLOOD PRESSURE: 84 MMHG | HEIGHT: 71 IN | SYSTOLIC BLOOD PRESSURE: 124 MMHG | RESPIRATION RATE: 18 BRPM | WEIGHT: 220 LBS | TEMPERATURE: 98.3 F | OXYGEN SATURATION: 98 % | HEART RATE: 94 BPM

## 2020-11-04 DIAGNOSIS — J01.01 ACUTE RECURRENT MAXILLARY SINUSITIS: ICD-10-CM

## 2020-11-04 DIAGNOSIS — R05.9 COUGH: Primary | ICD-10-CM

## 2020-11-04 DIAGNOSIS — K21.9 GASTROESOPHAGEAL REFLUX DISEASE, UNSPECIFIED WHETHER ESOPHAGITIS PRESENT: ICD-10-CM

## 2020-11-04 PROCEDURE — 94726 PLETHYSMOGRAPHY LUNG VOLUMES: CPT | Performed by: INTERNAL MEDICINE

## 2020-11-04 PROCEDURE — 99204 OFFICE O/P NEW MOD 45 MIN: CPT | Performed by: INTERNAL MEDICINE

## 2020-11-04 PROCEDURE — 94375 RESPIRATORY FLOW VOLUME LOOP: CPT | Performed by: INTERNAL MEDICINE

## 2020-11-04 PROCEDURE — 94729 DIFFUSING CAPACITY: CPT | Performed by: INTERNAL MEDICINE

## 2020-11-04 RX ORDER — FLUTICASONE PROPIONATE 50 MCG
2 SPRAY, SUSPENSION (ML) NASAL DAILY
Qty: 16 G | Refills: 5 | Status: SHIPPED | OUTPATIENT
Start: 2020-11-04

## 2020-11-04 RX ORDER — AMOXICILLIN AND CLAVULANATE POTASSIUM 875; 125 MG/1; MG/1
1 TABLET, FILM COATED ORAL 2 TIMES DAILY
Qty: 20 TABLET | Refills: 0 | Status: SHIPPED | OUTPATIENT
Start: 2020-11-04 | End: 2020-11-14

## 2020-11-04 RX ORDER — MONTELUKAST SODIUM 10 MG/1
10 TABLET ORAL NIGHTLY
Qty: 30 TABLET | Refills: 11 | Status: SHIPPED | OUTPATIENT
Start: 2020-11-04

## 2020-11-04 NOTE — PROGRESS NOTES
New Patient Pulmonary Office Visit      Patient Name: Kashif Garcia    Referring Physician: Felecia Kent APRN    Chief Complaint:    Chief Complaint   Patient presents with   • Cough   • Shortness of Breath   • Chest Tightness   • Morning Headaches       History of Present Illness: Kashif Garcia is a 35 y.o. male who is here today to establish care with Pulmonary.  Patient with a past medical history significant for reflux.  He presents to pulmonary for evaluation of a cough.    The patient presents for the complaint of a cough. The patient states the cough began 4 weeks ago. The patients describes the cough as productive of clear sputum, harsh, barking, worsening over time. The cough is exacerbated by no obvious factors. The cough is alleviated by albuterol. The pt describes no other associated symptoms.  He does work at [employer], initially had a sore throat which is since resolved.  He also notes he has a fairly significant when of acid reflux but has not been taking the Nexium on a daily basis only takes it on an as-needed basis.  He has no other complaints.    Review of Systems:   Review of Systems   Constitutional: Negative for activity change, appetite change, chills and diaphoresis.   HENT: Negative for congestion, postnasal drip, sinus pressure and voice change.    Eyes: Negative for blurred vision.   Respiratory: Positive for cough. Negative for shortness of breath and wheezing.    Cardiovascular: Negative for chest pain.   Gastrointestinal: Negative for abdominal pain.   Musculoskeletal: Negative for myalgias.   Skin: Negative for color change and dry skin.   Allergic/Immunologic: Negative for environmental allergies.   Neurological: Negative for weakness and confusion.   Hematological: Negative for adenopathy.   Psychiatric/Behavioral: Negative for sleep disturbance and depressed mood.       Past Medical History:   Past Medical History:   Diagnosis Date   • GERD (gastroesophageal reflux disease)    •  "Headache    • Insomnia        Past Surgical History:   Past Surgical History:   Procedure Laterality Date   • ADENOIDECTOMY     • TONSILLECTOMY         Family History:   Family History   Adopted: Yes   Family history unknown: Yes       Social History:   Social History     Socioeconomic History   • Marital status:      Spouse name: Not on file   • Number of children: Not on file   • Years of education: Not on file   • Highest education level: Not on file   Tobacco Use   • Smoking status: Former Smoker     Packs/day: 0.50     Years: 15.00     Pack years: 7.50     Types: Cigarettes     Quit date:      Years since quittin.8   • Smokeless tobacco: Former User   Substance and Sexual Activity   • Alcohol use: Yes     Comment: occasionally    • Drug use: No   • Sexual activity: Defer       Medications:     Current Outpatient Medications:   •  acetaminophen-codeine (TYLENOL #3) 300-30 MG per tablet, Take 1 tablet by mouth Every 4 (Four) Hours As Needed for Moderate Pain ., Disp: 10 tablet, Rfl: 0  •  albuterol sulfate  (90 Base) MCG/ACT inhaler, Inhale 2 puffs., Disp: , Rfl:   •  esomeprazole (nexIUM) 20 MG capsule, Take 20 mg by mouth., Disp: , Rfl:   •  amoxicillin-clavulanate (AUGMENTIN) 875-125 MG per tablet, Take 1 tablet by mouth 2 (Two) Times a Day for 10 days., Disp: 20 tablet, Rfl: 0  •  fluticasone (Flonase) 50 MCG/ACT nasal spray, 2 sprays into the nostril(s) as directed by provider Daily., Disp: 16 g, Rfl: 5  •  montelukast (SINGULAIR) 10 MG tablet, Take 1 tablet by mouth Every Night., Disp: 30 tablet, Rfl: 11    Allergies:   Allergies   Allergen Reactions   • Ceclor [Cefaclor]        Physical Exam:  Vital Signs:   Vitals:    20 1208   BP: 124/84   BP Location: Right arm   Patient Position: Sitting   Cuff Size: Adult   Pulse: 94   Resp: 18   Temp: 98.3 °F (36.8 °C)   SpO2: 98%  Comment: room air at rest   Weight: 99.8 kg (220 lb)   Height: 180.3 cm (71\")       Physical Exam  Vitals " signs and nursing note reviewed.   Constitutional:       General: He is not in acute distress.     Appearance: He is well-developed and normal weight. He is not ill-appearing or toxic-appearing.   HENT:      Head: Normocephalic and atraumatic.      Comments: Tenderness over the maxillary sinuses bilaterally     Right Ear: External ear normal.      Left Ear: External ear normal.      Nose: Nose normal.      Mouth/Throat:      Mouth: Mucous membranes are moist.      Pharynx: Oropharynx is clear. No oropharyngeal exudate or posterior oropharyngeal erythema.   Eyes:      Conjunctiva/sclera: Conjunctivae normal.      Pupils: Pupils are equal, round, and reactive to light.   Neck:      Musculoskeletal: Normal range of motion and neck supple. No neck rigidity.   Cardiovascular:      Rate and Rhythm: Normal rate and regular rhythm.      Pulses: Normal pulses.      Heart sounds: Normal heart sounds. No murmur. No friction rub. No gallop.    Pulmonary:      Effort: Pulmonary effort is normal. No respiratory distress.      Breath sounds: Normal breath sounds. No wheezing, rhonchi or rales.   Abdominal:      General: Bowel sounds are normal. There is no distension.      Palpations: Abdomen is soft.      Tenderness: There is no abdominal tenderness. There is no rebound.   Musculoskeletal: Normal range of motion.      Right lower leg: No edema.      Left lower leg: No edema.   Skin:     General: Skin is warm and dry.      Capillary Refill: Capillary refill takes less than 2 seconds.   Neurological:      General: No focal deficit present.      Mental Status: He is alert and oriented to person, place, and time.   Psychiatric:         Mood and Affect: Mood normal.         Behavior: Behavior normal.         Thought Content: Thought content normal.         Judgment: Judgment normal.         Results Review:   - I personally reviewed the pts imaging from chest x-ray 10/16/2020 which showed no acute cardiopulmonary process.  - I  personally reviewed the pts labs which was significant for a negative Covid test.  - I personally reviewed the pts PFT from 11/4/2020 shows no obstruction or restriction with a normal DLCO.    Assessment / Plan:   1. Cough (Primary)  -At this point the cough is still acute in nature, 3 months we start defining things is a chronic cough.  Explained to him that the 3 most common causes of chronic cough are allergies, asthma, and reflux.  Therefore for now I have gone ahead and given him a regimen of medicines to help with the cough and decongestion.  Ultimately if this continues after 3 months we will do further evaluation with a CT scan of the chest, increasing inhaler therapy, as well as escalating reflux medications.  -I will start the patient on Flonase 2 puffs per nostril nightly x6 weeks, Singulair 10 mg nightly (prescription sent ) a second generation antihistamine (loratidine, cetirizine, fexofenadine) daily x2 weeks, a decongestant (pseudoephedrine) x2 weeks, a first generation antihistamine (Benadryl) nightly x2 weeks, and Afrin (oxymetazoline) 2 to 3 sprays per nostril twice daily x48 hours.  I discussed with the patient the risk and benefits of these medications including but not limited to urinary retention, sleep disturbances, drowsiness and rebound rhinitis.  The patient verbalized understanding.  -Patient does not appear infectious at this period time and therefore he is okay to go back to work.  He has had multiple negative Covid test and despite having a cough.  This is not infectious.    2. Acute recurrent maxillary sinusitis  -I do the patient has underlying maxillary sinusitis he is especially tender in this area.  Is going to start on the above regimen as previously noted, and I have also prescribed him Augmentin twice daily for the next 10 days.    3. Gastroesophageal reflux disease, unspecified whether esophagitis present  -Patient does have underlying reflux, I have asked him to go ahead and  start taking Nexium 20 mg daily consistently to potentially help with the cough, we will escalate therapy at the next visit if he continues to have the underlying cough.      Follow Up:   Return in about 2 months (around 1/4/2021).     LUZ Rosas, DO  Pulmonary and Critical Care Medicine  Note Electronically Signed    Please note that portions of this note may have been completed with a voice recognition program. Efforts were made to edit the dictations, but occasionally words are mistranscribed.

## 2020-11-06 ENCOUNTER — OFFICE VISIT (OUTPATIENT)
Dept: FAMILY MEDICINE CLINIC | Facility: CLINIC | Age: 35
End: 2020-11-06

## 2020-11-06 VITALS
TEMPERATURE: 98.6 F | HEIGHT: 71 IN | DIASTOLIC BLOOD PRESSURE: 76 MMHG | HEART RATE: 72 BPM | OXYGEN SATURATION: 98 % | WEIGHT: 217 LBS | SYSTOLIC BLOOD PRESSURE: 128 MMHG | BODY MASS INDEX: 30.38 KG/M2 | RESPIRATION RATE: 16 BRPM

## 2020-11-06 DIAGNOSIS — K21.9 GASTROESOPHAGEAL REFLUX DISEASE, UNSPECIFIED WHETHER ESOPHAGITIS PRESENT: ICD-10-CM

## 2020-11-06 DIAGNOSIS — R05.3 COUGH, PERSISTENT: Primary | ICD-10-CM

## 2020-11-06 PROCEDURE — 99213 OFFICE O/P EST LOW 20 MIN: CPT | Performed by: NURSE PRACTITIONER

## 2020-11-06 RX ORDER — PANTOPRAZOLE SODIUM 40 MG/1
40 TABLET, DELAYED RELEASE ORAL DAILY
Qty: 30 TABLET | Refills: 0 | Status: SHIPPED | OUTPATIENT
Start: 2020-11-06 | End: 2020-12-01

## 2020-11-06 RX ORDER — BENZONATATE 200 MG/1
200 CAPSULE ORAL 3 TIMES DAILY PRN
Qty: 30 CAPSULE | Refills: 0 | Status: SHIPPED | OUTPATIENT
Start: 2020-11-06

## 2020-11-06 NOTE — PROGRESS NOTES
Subjective   Kashif Garcia is a 35 y.o. male.     History of Present Illness   Went to Pulmonary and has released him back to work but he does not think he can complete his job.  He has a very physical job and it is very hard to wear a mask and talk and breath because he starts coughing as soon as he gets warmed up.   Voice is very hoarse and PND. No ST. Has issue with allergies worse this time of year.  Acid reflux is not taking anything for it was told to start something but his mother does not want him to do so.  Mild HA in top part of head; worse with coughing. Taking Tylenol 3 helps some.  Taking Singulair and Flonase and Augmentin for sinus infection, taking Claritin D also. Using Albuterol inhaler as needed  Stopped Breo   Supposed to follow up with Pulmonary in a few months  Builds head liners made of [employer]   Wonders if this is making his breathing worse.    The following portions of the patient's history were reviewed and updated as appropriate: allergies, current medications, past family history, past medical history, past social history, past surgical history and problem list.    Review of Systems   Constitutional: Negative.  Negative for appetite change, unexpected weight gain and unexpected weight loss.   HENT: Positive for sore throat and voice change.    Eyes: Negative.    Respiratory: Positive for cough. Negative for wheezing.    Cardiovascular: Negative.  Negative for chest pain and palpitations.   Gastrointestinal: Negative.  Positive for GERD.   Endocrine: Negative.    Genitourinary: Negative.    Musculoskeletal: Negative.    Skin: Negative.    Allergic/Immunologic: Positive for environmental allergies.   Neurological: Negative.    Hematological: Negative.    Psychiatric/Behavioral: Negative.  Negative for sleep disturbance and depressed mood. The patient is not nervous/anxious.    All other systems reviewed and are negative.      Objective   Physical Exam  Vitals signs and nursing note  reviewed.   Constitutional:       General: He is not in acute distress.     Appearance: Normal appearance. He is well-developed and normal weight. He is not ill-appearing.   HENT:      Head: Normocephalic.      Right Ear: External ear normal.      Left Ear: External ear normal.      Nose: Nose normal.   Eyes:      General: Lids are normal.   Neck:      Musculoskeletal: Neck supple.   Cardiovascular:      Rate and Rhythm: Normal rate and regular rhythm.      Heart sounds: Normal heart sounds, S1 normal and S2 normal.   Pulmonary:      Effort: Pulmonary effort is normal. No respiratory distress.      Breath sounds: Normal breath sounds. No wheezing.   Lymphadenopathy:      Cervical: No cervical adenopathy.   Skin:     General: Skin is warm and dry.   Neurological:      Mental Status: He is alert and oriented to person, place, and time.   Psychiatric:         Mood and Affect: Mood normal.         Speech: Speech normal.         Behavior: Behavior normal.         Thought Content: Thought content normal.         Judgment: Judgment normal.     Answers for HPI/ROS submitted by the patient on 11/6/2020   Cough  What is the primary reason for your visit?: Cough        Assessment/Plan   Diagnoses and all orders for this visit:    1. Cough, persistent (Primary)  -     benzonatate (TESSALON) 200 MG capsule; Take 1 capsule by mouth 3 (Three) Times a Day As Needed for Cough.  Dispense: 30 capsule; Refill: 0    2. Gastroesophageal reflux disease, unspecified whether esophagitis present  -     pantoprazole (PROTONIX) 40 MG EC tablet; Take 1 tablet by mouth Daily.  Dispense: 30 tablet; Refill: 0    Try some Tessalon Perles for cough  Start Pantoprazole daily for the next 2 weeks and continue meds as prescribed by Pulmonary  No return to work yet, will see pt back 11/13 video visit to see how he is doing. Pt agrees.

## 2020-11-13 ENCOUNTER — TELEMEDICINE (OUTPATIENT)
Dept: FAMILY MEDICINE CLINIC | Facility: CLINIC | Age: 35
End: 2020-11-13

## 2020-11-13 DIAGNOSIS — R52 GENERALIZED BODY ACHES: Primary | ICD-10-CM

## 2020-11-13 DIAGNOSIS — R05.3 COUGH, PERSISTENT: ICD-10-CM

## 2020-11-13 PROCEDURE — 99213 OFFICE O/P EST LOW 20 MIN: CPT | Performed by: NURSE PRACTITIONER

## 2020-11-13 NOTE — PROGRESS NOTES
You have chosen to receive care through a telehealth visit.  Do you consent to use a video/audio connection for your medical care today? Yes    HPI: Feeling better 2 days ago, then yesterday evening started feeling poorly having body aches late last night and today, children having fever that started yesterday and going to get checked for Covid today. Not coughing as much, voice is gone. ST mild today but thinks it is due to cough. Tessalon perles are helping, taking allergy meds and decongestants as prescribed by Pulmonary. Finished with antibiotics tomorrow.  Wants to go back to work.    ROS: no wheezing or sinus pressure congestion or drainage, no difficulty breathing just coughing and body aches    PE: video visit; awake and alert, dry cough noted when speaking, no respiratory distress    DX: body aches, persistent cough    Plan: will wait a few more days to see if symptoms resolve or for results of Covid test before return to work

## 2020-11-16 ENCOUNTER — TELEPHONE (OUTPATIENT)
Dept: FAMILY MEDICINE CLINIC | Facility: CLINIC | Age: 35
End: 2020-11-16

## 2020-11-16 NOTE — TELEPHONE ENCOUNTER
Gissel from [facility] [facility] [employer] requested a call back. Gissel stated she received notification that the patient saw EDUARDO Kent on 11/13/20 and he was kept out of work. Gissel stated she needs to verify an estimate of out of work duration and the reason for his absence.    Please call and advise. Gissel's call back [phone] extension *[phone]

## 2020-11-17 NOTE — TELEPHONE ENCOUNTER
Off until Tuesday of this week, to have video visit to recheck on Tuesday. Return of chills, body aches, cough worsening. Children sick too, was waiting for their Covid results. freddy

## 2020-11-18 ENCOUNTER — TELEPHONE (OUTPATIENT)
Dept: FAMILY MEDICINE CLINIC | Facility: CLINIC | Age: 35
End: 2020-11-18

## 2020-11-18 NOTE — TELEPHONE ENCOUNTER
Pt needs to get on my schedule tomorrow for return to work or I will be out until Dec 1 and I am not clearing him to be off from work any more because he was supposed to let me know today about how he is doing. brenda

## 2020-11-18 NOTE — TELEPHONE ENCOUNTER
PT IS REQUESTING A VIDEO VISIT BEFORE 11/25.     PT HAS TO BE SEEN TO HAVE SHORT TERM DISABILITY FORMS FILLED OUT STATING WHEN HE WILL BE ABLE TO GO BACK TO WORK.    PT WOULD LIKE TO BE WORKED IN IF POSSIBLE.    PT STATES THAT THE NAME BELOW SHOULD BE IF SHE HASN'T ALREADY CONTACTED OFFICE ABOUT MEDICAL RECORDS:    ERIK BROOKS  [phone]   [id number]    PLEASE ADVISE  [phone]

## 2020-11-19 ENCOUNTER — OFFICE VISIT (OUTPATIENT)
Dept: FAMILY MEDICINE CLINIC | Facility: CLINIC | Age: 35
End: 2020-11-19

## 2020-11-19 VITALS
BODY MASS INDEX: 30.46 KG/M2 | DIASTOLIC BLOOD PRESSURE: 90 MMHG | HEIGHT: 71 IN | RESPIRATION RATE: 24 BRPM | TEMPERATURE: 98.5 F | OXYGEN SATURATION: 100 % | SYSTOLIC BLOOD PRESSURE: 140 MMHG | HEART RATE: 118 BPM | WEIGHT: 217.6 LBS

## 2020-11-19 DIAGNOSIS — R05.3 COUGH, PERSISTENT: Primary | ICD-10-CM

## 2020-11-19 PROCEDURE — 99213 OFFICE O/P EST LOW 20 MIN: CPT | Performed by: NURSE PRACTITIONER

## 2020-11-19 NOTE — PROGRESS NOTES
Subjective   Kashif Garcia is a 35 y.o. male.     History of Present Illness   F/U persistent cough    Continues to have persistent barking, harsh non productive cough and hoarseness, difficulty breathing worse with wearing a mask. Reports difficulty breathing if he gets hot or starts to be active, going outside to watch his children on the trampoline was difficult for him to breath.   Has a very physical job and concerned that he will be able to do his job if he returns to work at this time. Has coughing spells/fits that cause him to feel dizzy. If he goes back and reports this to S he is worried that he will be sent home or possibly lose his job.    Feeling frustrated because has been doing all that he has been told to do by specialist but still no improvement. Taking daily allergy meds, using Flonase NS daily and Albuterol HFA 2 times a day as needed. Taking Pantoprazole has not seemed to help cough. Finished antibiotics.    Says he is wanting to return to work due to financial issues, barely makes any money right now and his wife cannot work due to physical problems, and they have 4 children.    Wants to see Pulmonary again but cannot get appt until January was told he could go back to work by specialist.  Reports that his PFT was negative for asthma or restriction and stopped the inhaler that seemed to help the most Breo.    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 Also has had asbestos exposure at times. Denies tobacco abuse.     The following portions of the patient's history were reviewed and updated as appropriate: allergies, current medications, past family history, past medical history, past social history, past surgical history and problem list.    Review of Systems   Constitutional: Positive for activity change. Negative for chills and fever.   HENT: Positive for postnasal drip, rhinorrhea and sore throat. Negative for swollen glands.    Respiratory: Positive for cough and  shortness of breath. Negative for chest tightness and wheezing.    Cardiovascular: Negative for chest pain and palpitations.   Gastrointestinal: Negative.    Musculoskeletal: Negative.  Negative for back pain, myalgias, neck pain and neck stiffness.   Skin: Negative.    Neurological: Positive for light-headedness and headache.   Psychiatric/Behavioral: Positive for stress.       Objective   Physical Exam  Vitals signs reviewed.   Constitutional:       General: He is not in acute distress.     Appearance: Normal appearance. He is not toxic-appearing.   HENT:      Head: Normocephalic.      Right Ear: External ear normal.      Left Ear: External ear normal.      Nose: Nose normal.      Comments: At times speaks in nasally tone as if congested other times normal     Mouth/Throat:      Comments: At times speaks with hoarseness and high pitched tone, then other times speaks normally  Neck:      Musculoskeletal: Neck supple.   Cardiovascular:      Rate and Rhythm: Normal rate and regular rhythm.      Heart sounds: Normal heart sounds.   Pulmonary:      Effort: Pulmonary effort is normal. No respiratory distress.      Breath sounds: Normal breath sounds. No stridor. No wheezing, rhonchi or rales.      Comments: Loud dry coughing spells elicited with taking deep breath and randomly through out visit  Skin:     General: Skin is warm and dry.   Neurological:      Mental Status: He is alert and oriented to person, place, and time.           Assessment/Plan   Diagnoses and all orders for this visit:    1. Cough, persistent (Primary)  -     Cancel: Ambulatory Referral to Allergy  -     Ambulatory Referral to Allergy    will refer to allergy at patient request and attempt to get pt back in with Pulmonary sooner than January   I have explained to Mr Garcia that there is nothing else for me to do to help him, I have to go by what he says but also what the specialist that I am sending him to are saying about him returning to work.    Will hold off sending him back to [facility] at this time

## 2020-11-20 ENCOUNTER — TELEPHONE (OUTPATIENT)
Dept: PULMONOLOGY | Facility: CLINIC | Age: 35
End: 2020-11-20

## 2020-11-20 NOTE — TELEPHONE ENCOUNTER
Attempted to call pt to schedule follow up for next available per Dr Rosas, no answer and mail box is full.

## 2020-11-29 DIAGNOSIS — K21.9 GASTROESOPHAGEAL REFLUX DISEASE, UNSPECIFIED WHETHER ESOPHAGITIS PRESENT: ICD-10-CM

## 2020-12-01 RX ORDER — PANTOPRAZOLE SODIUM 40 MG/1
TABLET, DELAYED RELEASE ORAL
Qty: 30 TABLET | Refills: 0 | Status: SHIPPED | OUTPATIENT
Start: 2020-12-01

## 2020-12-03 ENCOUNTER — TELEMEDICINE (OUTPATIENT)
Dept: FAMILY MEDICINE CLINIC | Facility: CLINIC | Age: 35
End: 2020-12-03

## 2020-12-03 DIAGNOSIS — R50.9 LOW GRADE FEVER: Primary | ICD-10-CM

## 2020-12-03 PROCEDURE — 99441 PR PHYS/QHP TELEPHONE EVALUATION 5-10 MIN: CPT | Performed by: NURSE PRACTITIONER

## 2020-12-03 NOTE — PROGRESS NOTES
You have chosen to receive care through a telehealth visit.  Do you consent to use a video/audio connection for your medical care today? Yes telephone visit lasted 6 minutes    HPI: fever x 2 days  Temp 99.8 this morning and but no other symptoms, clear mucus, feeling really good. Went to allergist yesterday Dr Boswell put pt off work until Dec 22.   Taking regular Tylenol which is helping with fever  Pt reports that he is no longer having chest congestion, cough or difficulty breathing.  Was told by allergy to call PCP if still having fever.    ROS: as above    PE: Telephone visit unable to visualize pt    Dx: low grade temp with no other symptoms    Plan: Released to return to work today without restrictions.   F/U with Pulmonary and or Allergy regarding any more time off required. Pt agrees